# Patient Record
Sex: FEMALE | Race: WHITE
[De-identification: names, ages, dates, MRNs, and addresses within clinical notes are randomized per-mention and may not be internally consistent; named-entity substitution may affect disease eponyms.]

---

## 2018-01-19 ENCOUNTER — HOSPITAL ENCOUNTER (OUTPATIENT)
Dept: HOSPITAL 89 - US | Age: 23
End: 2018-01-19
Attending: NURSE PRACTITIONER
Payer: COMMERCIAL

## 2018-01-19 DIAGNOSIS — R10.812: Primary | ICD-10-CM

## 2018-01-19 DIAGNOSIS — R10.811: ICD-10-CM

## 2018-01-19 PROCEDURE — 76705 ECHO EXAM OF ABDOMEN: CPT

## 2018-01-19 NOTE — RADIOLOGY IMAGING REPORT
FACILITY: [facility] [place] 

 

PATIENT NAME: Delisa Quach

: 1995

MR: [id number]

V: [id number]

EXAM DATE: 

ORDERING PHYSICIAN: JACINDA BENITEZ

TECHNOLOGIST: 

 

Location: [facility] - [place]

Patient: Delisa Quach

: 1995

MRN: [id number]

Visit/Account:[id number]

Date of Sevice:  2018

 

ACCESSION #: [id number]

 

GALLBLADDER

 

HISTORY:  October quadrant pain

 

COMPARISON:  None.

 

FINDINGS:

 

Gallbladder: Negative..

Bile ducts: There is no biliary ductal dilation with the CBD measuring 2-3 mm.

Liver: Negative.

Pancreas: Negative.

Right kidney: Negative.

Upper abdominal aorta and IVC: Patent.

Ascites: None visualized.

Other findings: None significant

 

IMPRESSION:

 

1.  Normal

 

Report Dictated By: Benson Buenrostro MD at 2018 2:14 PM

 

Report E-Signed By: Benson Buenrostro MD  at 2018 2:25 PM

 

WSN:[id number]